# Patient Record
Sex: MALE | Race: WHITE | NOT HISPANIC OR LATINO | Employment: FULL TIME | URBAN - NONMETROPOLITAN AREA
[De-identification: names, ages, dates, MRNs, and addresses within clinical notes are randomized per-mention and may not be internally consistent; named-entity substitution may affect disease eponyms.]

---

## 2017-11-17 ENCOUNTER — HOSPITAL ENCOUNTER (OUTPATIENT)
Dept: GENERAL RADIOLOGY | Facility: HOSPITAL | Age: 57
Discharge: HOME | End: 2017-11-17
Admitting: PHYSICIAN ASSISTANT

## 2017-11-17 ENCOUNTER — TRANSCRIBE ORDERS (OUTPATIENT)
Dept: ADMINISTRATIVE | Facility: HOSPITAL | Age: 57
End: 2017-11-17

## 2017-11-17 DIAGNOSIS — G89.29 CHRONIC LEFT SHOULDER PAIN: Primary | ICD-10-CM

## 2017-11-17 DIAGNOSIS — M25.512 CHRONIC LEFT SHOULDER PAIN: Primary | ICD-10-CM

## 2017-11-17 DIAGNOSIS — T14.90XA INJURY: ICD-10-CM

## 2017-11-17 PROCEDURE — 73030 X-RAY EXAM OF SHOULDER: CPT

## 2017-12-05 ENCOUNTER — OFFICE VISIT (OUTPATIENT)
Dept: ORTHOPEDIC SURGERY | Facility: CLINIC | Age: 57
End: 2017-12-05

## 2017-12-05 VITALS — RESPIRATION RATE: 18 BRPM | HEIGHT: 72 IN | WEIGHT: 253 LBS | BODY MASS INDEX: 34.27 KG/M2

## 2017-12-05 DIAGNOSIS — M75.82 ROTATOR CUFF TENDINITIS, LEFT: ICD-10-CM

## 2017-12-05 DIAGNOSIS — M75.42 SHOULDER IMPINGEMENT SYNDROME, LEFT: ICD-10-CM

## 2017-12-05 DIAGNOSIS — S49.92XA SHOULDER INJURY, LEFT, INITIAL ENCOUNTER: Primary | ICD-10-CM

## 2017-12-05 PROCEDURE — 99203 OFFICE O/P NEW LOW 30 MIN: CPT | Performed by: PHYSICIAN ASSISTANT

## 2017-12-05 RX ORDER — TAMSULOSIN HYDROCHLORIDE 0.4 MG/1
CAPSULE ORAL
Refills: 1 | COMMUNITY
Start: 2017-11-12

## 2017-12-05 RX ORDER — METHOCARBAMOL 750 MG/1
TABLET, FILM COATED ORAL
Refills: 1 | COMMUNITY
Start: 2017-11-12 | End: 2018-02-21 | Stop reason: ALTCHOICE

## 2017-12-05 NOTE — PROGRESS NOTES
Subjective   Patient ID: Puma Lino is a 57 y.o. right hand dominant male is here today for a treatment planning discussion. Pain and Consult of the Left Shoulder (Patient is being seen today at the request of Dr. Alvarez./)         History of Present Illness  Patient presents as a new patient with complaints of left shoulder pain.  He states approximately 3 months ago while bench pressing he felt intense pain to the left shoulder and had immediately stop lifting weights.  He states since certain movements intensifies pain.  He denies currently neck pain or numbness and tingling.  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  He had noticed some soreness to shoulder before Bench pressing.         Pain Score: 3  Pain Location: Shoulder  Pain Orientation: Left     Pain Descriptors: Aching  Pain Frequency: Constant/continuous  Pain Onset: Ongoing  Date Pain First Started:  (about 4 months)     Aggravating Factors:  (use of shoulder, lifting and reaching over head)        Pain Intervention(s): Rest, Heat applied, Massage, Home medication (ibuprofen)  Result of Injury: No  Work-Related Injury: No    Past Medical History:   Diagnosis Date   • Asthma         Past Surgical History:   Procedure Laterality Date   • APPENDECTOMY      age 6       Family History   Problem Relation Age of Onset   • Diabetes Other        Social History     Social History   • Marital status: Single     Spouse name: N/A   • Number of children: N/A   • Years of education: N/A     Occupational History   •  [employer]     Social History Main Topics   • Smoking status: Never Smoker   • Smokeless tobacco: Current User     Types: Snuff   • Alcohol use Yes      Comment: socially   • Drug use: No   • Sexual activity: Defer     Other Topics Concern   • Not on file     Social History Narrative   • No narrative on file       No Known Allergies    Review  "of Systems   Constitutional: Negative for fever.   HENT: Negative for voice change.    Eyes: Negative for visual disturbance.   Respiratory: Negative for shortness of breath.    Cardiovascular: Negative for chest pain.   Gastrointestinal: Negative for abdominal distention and abdominal pain.   Genitourinary: Negative for dysuria.   Musculoskeletal: Positive for arthralgias. Negative for gait problem and joint swelling.   Skin: Negative for rash.   Neurological: Negative for speech difficulty.   Hematological: Does not bruise/bleed easily.   Psychiatric/Behavioral: Negative for confusion.       Objective   Resp 18  Ht 184 cm (72.44\")  Wt 115 kg (253 lb)  BMI 33.9 kg/m2   Physical Exam   Constitutional: He is oriented to person, place, and time. He appears well-nourished.   Neck: No tracheal deviation present.   Pulmonary/Chest: Effort normal.   Musculoskeletal:        Left shoulder: He exhibits tenderness and pain. He exhibits no swelling, no effusion, no crepitus and no deformity.        Left hand: He exhibits normal capillary refill, no deformity and no swelling. Normal sensation noted. Normal strength noted. He exhibits no thumb/finger opposition.   Neurological: He is alert and oriented to person, place, and time.   Skin: No pallor.   Psychiatric: He has a normal mood and affect.   Vitals reviewed.    Left Shoulder Exam     Range of Motion   Active Abduction: 120   Forward Flexion: 130     Muscle Strength   The patient has normal left shoulder strength.    Tests   Apprehension: negative  Cross Arm: positive  Drop Arm: negative  Impingement: positive    Other   Erythema: absent  Sensation: normal  Pulse: present              Neurologic Exam     Mental Status   Oriented to person, place, and time.      Left Shoulder Exam     Muscle Strength   Normal left shoulder strength               Assessment/Plan   Independent Review of Radiographic Studies:    Shows no acute fracture or dislocation.      Procedures     "   Puma was seen today for pain and consult.    Diagnoses and all orders for this visit:    Shoulder injury, left, initial encounter  -     MRI shoulder left wo contrast    Rotator cuff tendinitis, left  -     MRI shoulder left wo contrast    Shoulder impingement syndrome, left  -     MRI shoulder left wo contrast       Orthopedic activities reviewed and patient expressed appreciation  Discussion of orthopedic goals  Risk, benefits, and merits of treatment alternatives reviewed with the patient and questions answered  Avoid offending activity  Ice, heat, and/or modalities as beneficial    Recommendations/Plan:  Exercise, medications, injections, other patient advice, and return appointment as noted.  Patient is encouraged to call or return for any issues or concerns.  FU after MRI   ]  Patient agreeable to call or return sooner for any concerns.

## 2017-12-08 ENCOUNTER — HOSPITAL ENCOUNTER (OUTPATIENT)
Dept: MRI IMAGING | Facility: HOSPITAL | Age: 57
Discharge: HOME | End: 2017-12-08
Admitting: PHYSICIAN ASSISTANT

## 2017-12-08 PROCEDURE — 73221 MRI JOINT UPR EXTREM W/O DYE: CPT

## 2017-12-19 ENCOUNTER — OFFICE VISIT (OUTPATIENT)
Dept: ORTHOPEDIC SURGERY | Facility: CLINIC | Age: 57
End: 2017-12-19

## 2017-12-19 VITALS — HEIGHT: 72 IN | BODY MASS INDEX: 34.67 KG/M2 | WEIGHT: 256 LBS | RESPIRATION RATE: 18 BRPM

## 2017-12-19 DIAGNOSIS — M19.019 AC JOINT ARTHROPATHY: ICD-10-CM

## 2017-12-19 DIAGNOSIS — M25.812 OS ACROMIALE OF LEFT SHOULDER: ICD-10-CM

## 2017-12-19 DIAGNOSIS — M75.82 ROTATOR CUFF TENDINITIS, LEFT: ICD-10-CM

## 2017-12-19 DIAGNOSIS — M75.42 SHOULDER IMPINGEMENT SYNDROME, LEFT: ICD-10-CM

## 2017-12-19 DIAGNOSIS — S43.432A LABRAL TEAR OF SHOULDER, LEFT, INITIAL ENCOUNTER: Primary | ICD-10-CM

## 2017-12-19 DIAGNOSIS — M75.52 BURSITIS OF SHOULDER, LEFT: ICD-10-CM

## 2017-12-19 PROCEDURE — 20610 DRAIN/INJ JOINT/BURSA W/O US: CPT | Performed by: PHYSICIAN ASSISTANT

## 2017-12-19 PROCEDURE — 99213 OFFICE O/P EST LOW 20 MIN: CPT | Performed by: PHYSICIAN ASSISTANT

## 2017-12-19 RX ORDER — BETAMETHASONE SODIUM PHOSPHATE AND BETAMETHASONE ACETATE 3; 3 MG/ML; MG/ML
6 INJECTION, SUSPENSION INTRA-ARTICULAR; INTRALESIONAL; INTRAMUSCULAR; SOFT TISSUE
Status: COMPLETED | OUTPATIENT
Start: 2017-12-19 | End: 2017-12-19

## 2017-12-19 RX ORDER — LIDOCAINE HYDROCHLORIDE 10 MG/ML
2 INJECTION, SOLUTION INFILTRATION; PERINEURAL
Status: COMPLETED | OUTPATIENT
Start: 2017-12-19 | End: 2017-12-19

## 2017-12-19 RX ADMIN — BETAMETHASONE SODIUM PHOSPHATE AND BETAMETHASONE ACETATE 6 MG: 3; 3 INJECTION, SUSPENSION INTRA-ARTICULAR; INTRALESIONAL; INTRAMUSCULAR; SOFT TISSUE at 09:09

## 2017-12-19 RX ADMIN — LIDOCAINE HYDROCHLORIDE 2 ML: 10 INJECTION, SOLUTION INFILTRATION; PERINEURAL at 09:09

## 2017-12-19 NOTE — PROGRESS NOTES
Subjective   Patient ID: Puma Lino is a 57 y.o. right hand dominant male is here today for follow-up for left shoulder pain Follow-up and Pain of the Left Shoulder         History of Present Illness    Patient is following up in regards to left shoulder pain as well as to review MRI results.  Patient initially noticed left shoulder pain 3 months prior all bench pressing when he felt intense pain to the left shoulder.  He denies having neck pain.  Denies numbness or tingling.  He has tried anti-inflammatories with no relief in his pain.    Pain Score: 3  Pain Location: Shoulder  Pain Orientation: Left        Pain Frequency: Constant/continuous           Aggravating Factors:  (using arm)        Pain Intervention(s): Rest  Result of Injury: No  Work-Related Injury: No    Past Medical History:   Diagnosis Date   • Asthma         Past Surgical History:   Procedure Laterality Date   • APPENDECTOMY      age 6       Family History   Problem Relation Age of Onset   • Diabetes Other        Social History     Social History   • Marital status: Single     Spouse name: N/A   • Number of children: N/A   • Years of education: N/A     Occupational History   •  [employer]     Social History Main Topics   • Smoking status: Never Smoker   • Smokeless tobacco: Current User     Types: Snuff   • Alcohol use Yes      Comment: socially   • Drug use: No   • Sexual activity: Defer     Other Topics Concern   • Not on file     Social History Narrative       No Known Allergies    Review of Systems   Constitutional: Negative for fever.   HENT: Negative for voice change.    Eyes: Negative for visual disturbance.   Respiratory: Negative for shortness of breath.    Cardiovascular: Negative for chest pain.   Gastrointestinal: Negative for abdominal distention and abdominal pain.   Genitourinary: Negative for dysuria.   Musculoskeletal: Positive for arthralgias. Negative for gait problem and joint swelling.   Skin: Negative  "for rash.   Neurological: Negative for speech difficulty.   Hematological: Does not bruise/bleed easily.   Psychiatric/Behavioral: Negative for confusion.       Objective   Resp 18  Ht 184 cm (72.44\")  Wt 116 kg (256 lb)  BMI 34.3 kg/m2   Physical Exam   Constitutional: He appears well-nourished.   Neck: No tracheal deviation present.   Musculoskeletal:        Left shoulder: He exhibits tenderness and pain. He exhibits normal range of motion and no deformity.        Left hand: He exhibits normal capillary refill and no deformity. Normal sensation noted. Normal strength noted.   Neurological: He is alert.   Psychiatric: He has a normal mood and affect.   Vitals reviewed.    Left Shoulder Exam     Range of Motion   Left shoulder active abduction: 122.   Left shoulder forward flexion: 125.   Internal Rotation 0 degrees: Sacrum     Muscle Strength   The patient has normal left shoulder strength.    Tests   Apprehension: positive  Cross Arm: positive  Drop Arm: negative  Impingement: positive    Other   Erythema: absent  Sensation: normal  Pulse: present              Neurologic Exam   Left Shoulder Exam     Muscle Strength   Normal left shoulder strength               Assessment/Plan   Independent Review of Radiographic Studies:    No new imaging done today.    I did review an MRI of the left shoulder with the patient and the office.  Per radiology there is a probable posterior labral tear, a partial tear of the supraspinatus, and pes planus and subscapularis without full-thickness defect.  There is subacromial bursitis    Large Joint Arthrocentesis  Date/Time: 12/19/2017 9:09 AM  Consent given by: patient  Site marked: site marked  Timeout: Immediately prior to procedure a time out was called to verify the correct patient, procedure, equipment, support staff and site/side marked as required   Supporting Documentation  Indications: pain   Procedure Details  Location: shoulder - L subacromial bursa  Preparation: " Patient was prepped and draped in the usual sterile fashion  Needle size: 22 G  Approach: posterior  Medications administered: 2 mL lidocaine 1 %; 6 mg betamethasone acetate-betamethasone sodium phosphate 6 (3-3) MG/ML  Patient tolerance: patient tolerated the procedure well with no immediate complications             Puma was seen today for follow-up and pain.    Diagnoses and all orders for this visit:    Labral tear of shoulder, left, initial encounter  -     Ambulatory Referral to Physical Therapy Evaluate and treat, Ortho  -     Large Joint Arthrocentesis    Rotator cuff tendinitis, left  -     Ambulatory Referral to Physical Therapy Evaluate and treat, Ortho  -     Large Joint Arthrocentesis    Bursitis of shoulder, left  -     Ambulatory Referral to Physical Therapy Evaluate and treat, Ortho  -     Large Joint Arthrocentesis    AC joint arthropathy  -     Ambulatory Referral to Physical Therapy Evaluate and treat, Ortho  -     Large Joint Arthrocentesis    Os acromiale of left shoulder  -     Ambulatory Referral to Physical Therapy Evaluate and treat, Ortho  -     Large Joint Arthrocentesis    Shoulder impingement syndrome, left  -     Ambulatory Referral to Physical Therapy Evaluate and treat, Ortho  -     Large Joint Arthrocentesis       Orthopedic activities reviewed and patient expressed appreciation  Discussion of orthopedic goals  Risk, benefits, and merits of treatment alternatives reviewed with the patient and questions answered  Physical therapy referral given  Call or notify for any adverse effect from injection therapy    Recommendations/Plan:  Exercise, medications, injections, other patient advice, and return appointment as noted.  Patient is encouraged to call or return for any issues or concerns.  I discussed with the patient several treatment options which include nonsurgical management, cortisone injection therapy referral.  If no relief in his symptoms within 6-8 weeks we could discuss  shoulder arthroscopy.  Patient is content with the plan of care.  Return in about 3 months (around 3/19/2018).  Patient agreeable to call or return sooner for any concerns.

## 2018-02-21 ENCOUNTER — OFFICE VISIT (OUTPATIENT)
Dept: ORTHOPEDIC SURGERY | Facility: CLINIC | Age: 58
End: 2018-02-21

## 2018-02-21 VITALS — HEIGHT: 72 IN | BODY MASS INDEX: 34.67 KG/M2 | WEIGHT: 256 LBS | RESPIRATION RATE: 18 BRPM

## 2018-02-21 DIAGNOSIS — M75.82 ROTATOR CUFF TENDINITIS, LEFT: ICD-10-CM

## 2018-02-21 DIAGNOSIS — S43.432A LABRAL TEAR OF SHOULDER, LEFT, INITIAL ENCOUNTER: Primary | ICD-10-CM

## 2018-02-21 DIAGNOSIS — M19.019 AC JOINT ARTHROPATHY: ICD-10-CM

## 2018-02-21 DIAGNOSIS — M75.52 BURSITIS OF SHOULDER, LEFT: ICD-10-CM

## 2018-02-21 PROCEDURE — 99213 OFFICE O/P EST LOW 20 MIN: CPT | Performed by: PHYSICIAN ASSISTANT

## 2018-02-21 RX ORDER — FINASTERIDE 5 MG/1
TABLET, FILM COATED ORAL
Refills: 0 | COMMUNITY
Start: 2018-02-13 | End: 2018-04-16

## 2018-02-21 RX ORDER — ORPHENADRINE CITRATE 100 MG/1
100 TABLET, EXTENDED RELEASE ORAL DAILY
Qty: 14 TABLET | Refills: 0 | Status: SHIPPED | OUTPATIENT
Start: 2018-02-21 | End: 2018-04-16

## 2018-02-21 RX ORDER — METHYLPREDNISOLONE 4 MG/1
TABLET ORAL
Qty: 21 TABLET | Refills: 0 | Status: SHIPPED | OUTPATIENT
Start: 2018-02-21 | End: 2018-04-16

## 2018-02-21 RX ORDER — CEFDINIR 300 MG/1
CAPSULE ORAL
Refills: 0 | COMMUNITY
Start: 2018-02-13 | End: 2018-04-16

## 2018-02-21 NOTE — PROGRESS NOTES
Subjective   Patient ID: Puma Lino is a 57 y.o. right hand dominant male is here today for follow-up for left shoulder pain Follow-up and Pain of the Left Shoulder         History of Present Illness    Patient is following up in regards to left shoulder discomfort.  He has attended 12 visits of physical therapy at [facility] and taken over-the-counter anti-inflammatories.  He states he is feeling about 30-40% better.  He denies neck pain.  Denies numbness or tingling to the left upper extremity.    Patient initially noticed his left shoulder pain Oct. 2017 while bench pressing.  Patient had MRI of left shoulder: 12-8-17 Probable left labral tear, tendinitis and small partial tears of supraspinatus, infraspinatus and subscapularis.  There is subacromial bursitis and degenerative changes of the acromioclavicular joint    Pain Score: 1  Pain Location: Shoulder  Pain Orientation: Left     Pain Descriptors: Aching, Sore  Pain Frequency: Intermittent           Aggravating Factors:  (use of arm)        Pain Intervention(s): Rest          Past Medical History:   Diagnosis Date   • Asthma         Past Surgical History:   Procedure Laterality Date   • APPENDECTOMY      age 6       Family History   Problem Relation Age of Onset   • Diabetes Other        Social History     Social History   • Marital status: Single     Spouse name: N/A   • Number of children: N/A   • Years of education: N/A     Occupational History   •  [employer]     Social History Main Topics   • Smoking status: Never Smoker   • Smokeless tobacco: Current User     Types: Snuff   • Alcohol use Yes      Comment: socially   • Drug use: No   • Sexual activity: Defer     Other Topics Concern   • Not on file     Social History Narrative       No Known Allergies    Review of Systems   Constitutional: Negative for fever.   HENT: Negative for voice change.    Eyes: Negative for visual disturbance.   Respiratory: Negative for shortness  "of breath.    Cardiovascular: Negative for chest pain.   Gastrointestinal: Negative for abdominal distention and abdominal pain.   Genitourinary: Negative for dysuria.   Musculoskeletal: Positive for arthralgias. Negative for gait problem and joint swelling.   Skin: Negative for rash.   Neurological: Negative for speech difficulty.   Hematological: Does not bruise/bleed easily.   Psychiatric/Behavioral: Negative for confusion.       Objective   Resp 18  Ht 184 cm (72.44\")  Wt 116 kg (256 lb)  BMI 34.3 kg/m2   Physical Exam   Constitutional: He is oriented to person, place, and time.   Eyes: Conjunctivae are normal.   Neck: No tracheal deviation present.   Pulmonary/Chest: Effort normal. No respiratory distress.   Musculoskeletal:        Left shoulder: He exhibits tenderness (posterior Sub acromial space) and pain. He exhibits normal range of motion, no swelling, no effusion, no crepitus and no deformity.        Left wrist: He exhibits normal range of motion, no tenderness, no bony tenderness and no swelling.        Cervical back: He exhibits normal range of motion, no tenderness, no bony tenderness and no swelling.        Left hand: He exhibits normal range of motion, no tenderness, normal capillary refill, no deformity and no swelling. Normal sensation noted. Normal strength noted. He exhibits no thumb/finger opposition.   Neurological: He is alert and oriented to person, place, and time.   Skin: No rash noted.   Psychiatric: He has a normal mood and affect. His behavior is normal.   Vitals reviewed.    Left Shoulder Exam     Tenderness   The patient is experiencing tenderness in the acromion and acromioclavicular joint.    Range of Motion   Active Abduction: 130   Forward Flexion: 130   Internal Rotation 0 degrees: Lumbar     Tests   Cross Arm: positive  Drop Arm: negative  Impingement: negative    Other   Erythema: absent  Sensation: normal  Pulse: present              Neurologic Exam     Mental Status "   Oriented to person, place, and time.      Ortho Exam         Assessment/Plan   Independent Review of Radiographic Studies:    No new imaging done today.       Procedures       Puma was seen today for follow-up and pain.    Diagnoses and all orders for this visit:    Labral tear of shoulder, left, initial encounter    Rotator cuff tendinitis, left    Bursitis of shoulder, left    AC joint arthropathy    Other orders  -     orphenadrine (NORFLEX) 100 MG 12 hr tablet; Take 1 tablet by mouth Daily.  -     MethylPREDNISolone (MEDROL, JOVANA,) 4 MG tablet; Use as directed by package instructions     Discussion of orthopedic goals  Risk, benefits, and merits of treatment alternatives reviewed with the patient and questions answered  Reduced physical activity as appropriate  Weight bearing parameters reviewed  Avoid offending activity  Ice, heat, and/or modalities as beneficial    Recommendations/Plan:  Exercise, medications, injections, other patient advice, and return appointment as noted.  Patient is encouraged to call or return for any issues or concerns.  We briefly discussed the option of left shoulder arthroscopy.  However, he states he would like to give therapy a little longer as he is noticing some improvement.  Fu after 4 weeks of PT    Patient agreeable to call or return sooner for any concerns.

## 2018-04-16 ENCOUNTER — OFFICE VISIT (OUTPATIENT)
Dept: ORTHOPEDIC SURGERY | Facility: CLINIC | Age: 58
End: 2018-04-16

## 2018-04-16 VITALS — BODY MASS INDEX: 35.08 KG/M2 | HEIGHT: 72 IN | RESPIRATION RATE: 18 BRPM | WEIGHT: 259 LBS

## 2018-04-16 DIAGNOSIS — M75.52 BURSITIS OF SHOULDER, LEFT: ICD-10-CM

## 2018-04-16 DIAGNOSIS — S43.432A LABRAL TEAR OF SHOULDER, LEFT, INITIAL ENCOUNTER: Primary | ICD-10-CM

## 2018-04-16 DIAGNOSIS — M19.019 AC JOINT ARTHROPATHY: ICD-10-CM

## 2018-04-16 DIAGNOSIS — M75.82 ROTATOR CUFF TENDINITIS, LEFT: ICD-10-CM

## 2018-04-16 PROCEDURE — 99213 OFFICE O/P EST LOW 20 MIN: CPT | Performed by: PHYSICIAN ASSISTANT

## 2018-04-16 RX ORDER — IBUPROFEN 800 MG/1
TABLET ORAL
Refills: 0 | COMMUNITY
Start: 2018-04-03

## 2018-04-16 NOTE — PROGRESS NOTES
Subjective   Patient ID: Puma Lino is a 58 y.o. right hand dominant male  Follow-up and Pain of the Left Shoulder         History of Present Illness  Patient is following up for routine follow-up visit.  Patient initially noticed his left shoulder pain Oct. 2017 while bench pressing.  Patient had MRI of left shoulder: 12-8-17 Probable left labral tear, tendinitis and small partial tears of supraspinatus, infraspinatus and subscapularis.  There is subacromial bursitis and degenerative changes of the acromioclavicular joint    Patient states he attended 3 months of physical therapy and is pleased report he is feeling much better.  He states he has a minor occasional pain with certain movements of the left arm but overall is very well pleased with his results.    Pain Score: no pain  Pain Location: Shoulder  Pain Orientation: Left      Past Medical History:   Diagnosis Date   • Asthma         Past Surgical History:   Procedure Laterality Date   • APPENDECTOMY      age 6       Family History   Problem Relation Age of Onset   • Diabetes Other        Social History     Social History   • Marital status: Single     Spouse name: N/A   • Number of children: N/A   • Years of education: N/A     Occupational History   •  [employer]     Social History Main Topics   • Smoking status: Never Smoker   • Smokeless tobacco: Current User     Types: Snuff   • Alcohol use Yes      Comment: socially   • Drug use: No   • Sexual activity: Defer     Other Topics Concern   • Not on file     Social History Narrative   • No narrative on file       No Known Allergies    Review of Systems   Constitutional: Negative for fever.   HENT: Negative for voice change.    Eyes: Negative for visual disturbance.   Respiratory: Negative for shortness of breath.    Cardiovascular: Negative for chest pain.   Gastrointestinal: Negative for abdominal distention and abdominal pain.   Genitourinary: Negative for dysuria.   Musculoskeletal:  "Positive for arthralgias. Negative for gait problem and joint swelling.   Skin: Negative for rash.   Neurological: Negative for speech difficulty.   Hematological: Does not bruise/bleed easily.   Psychiatric/Behavioral: Negative for confusion.   All other systems reviewed and are negative.      Objective   Resp 18   Ht 184 cm (72.44\")   Wt 117 kg (259 lb)   BMI 34.70 kg/m²    Physical Exam   Constitutional: He is oriented to person, place, and time. He appears well-nourished.   Neck: No tracheal deviation present.   Pulmonary/Chest: No respiratory distress.   Musculoskeletal:        Left shoulder: He exhibits normal range of motion, no tenderness, no bony tenderness, no swelling, no effusion, no crepitus, no deformity and no pain.        Left hand: He exhibits normal range of motion, no tenderness, normal capillary refill and no swelling. Normal sensation noted. He exhibits no thumb/finger opposition.   Neurological: He is alert and oriented to person, place, and time.   Skin: Capillary refill takes less than 2 seconds.   Psychiatric: He has a normal mood and affect. His behavior is normal.   Vitals reviewed.    Left Shoulder Exam     Range of Motion   Active Abduction: 140   Forward Flexion: 140   Internal Rotation 0 degrees: Sacrum     Muscle Strength   Abduction: 4/5   Internal Rotation: 5/5   External Rotation: 5/5   Supraspinatus: 5/5   Subscapularis: 5/5   Biceps: 5/5     Tests   Apprehension: negative  Cross Arm: positive  Drop Arm: negative  Hawkin's test: negative  Impingement: negative  Sulcus: absent    Other   Erythema: absent  Sensation: normal               Neurologic Exam     Mental Status   Oriented to person, place, and time.      Ortho Exam     Neg. spurling's      Assessment/Plan   Independent Review of Radiographic Studies:    No new imaging done today.  Reviewed with patient at a prior visit.      Procedures       Puma was seen today for follow-up and pain.    Diagnoses and all orders for " this visit:    Labral tear of shoulder, left, initial encounter    Rotator cuff tendinitis, left    Bursitis of shoulder, left    AC joint arthropathy       Orthopedic activities reviewed and patient expressed appreciation  Discussion of orthopedic goals  Risk, benefits, and merits of treatment alternatives reviewed with the patient and questions answered    Recommendations/Plan:  Patient is encouraged to call or return for any issues or concerns.    Patient states he is content with not having a shoulder arthroscopy at this time.  He will call back if the shoulder pain returns.  Patient agreeable to call or return sooner for any concerns.

## 2019-12-04 ENCOUNTER — HOSPITAL ENCOUNTER (OUTPATIENT)
Dept: GENERAL RADIOLOGY | Facility: HOSPITAL | Age: 59
Discharge: HOME | End: 2019-12-04
Admitting: PHYSICIAN ASSISTANT

## 2019-12-04 ENCOUNTER — TRANSCRIBE ORDERS (OUTPATIENT)
Dept: GENERAL RADIOLOGY | Facility: HOSPITAL | Age: 59
End: 2019-12-04

## 2019-12-04 DIAGNOSIS — M89.8X8 MASS OF STERNUM: ICD-10-CM

## 2019-12-04 DIAGNOSIS — M89.8X8 MASS OF STERNUM: Primary | ICD-10-CM

## 2019-12-04 PROCEDURE — 71120 X-RAY EXAM BREASTBONE 2/>VWS: CPT

## 2019-12-04 PROCEDURE — 71046 X-RAY EXAM CHEST 2 VIEWS: CPT

## 2021-08-30 ENCOUNTER — HOSPITAL ENCOUNTER (OUTPATIENT)
Dept: GENERAL RADIOLOGY | Facility: HOSPITAL | Age: 61
Discharge: HOME | End: 2021-08-30
Payer: COMMERCIAL

## 2021-08-30 ENCOUNTER — HOSPITAL ENCOUNTER (OUTPATIENT)
Facility: HOSPITAL | Age: 61
Discharge: HOME | End: 2021-08-30
Payer: COMMERCIAL

## 2021-08-30 ENCOUNTER — HOSPITAL ENCOUNTER (OUTPATIENT)
Dept: NURSING | Facility: HOSPITAL | Age: 61
Setting detail: INFUSION SERIES
Discharge: HOME | End: 2021-09-01
Payer: COMMERCIAL

## 2021-08-30 VITALS
OXYGEN SATURATION: 95 % | SYSTOLIC BLOOD PRESSURE: 154 MMHG | TEMPERATURE: 96.9 F | HEART RATE: 81 BPM | RESPIRATION RATE: 20 BRPM | DIASTOLIC BLOOD PRESSURE: 84 MMHG

## 2021-08-30 DIAGNOSIS — U07.1 COVID-19: Primary | ICD-10-CM

## 2021-08-30 DIAGNOSIS — R05.9 COUGH: ICD-10-CM

## 2021-08-30 DIAGNOSIS — U07.1 COVID-19: ICD-10-CM

## 2021-08-30 DIAGNOSIS — R50.9 FEVER, UNSPECIFIED FEVER CAUSE: ICD-10-CM

## 2021-08-30 PROCEDURE — 96365 THER/PROPH/DIAG IV INF INIT: CPT

## 2021-08-30 PROCEDURE — 2580000003 HC RX 258: Performed by: PHYSICIAN ASSISTANT

## 2021-08-30 PROCEDURE — 71046 X-RAY EXAM CHEST 2 VIEWS: CPT

## 2021-08-30 RX ORDER — METHYLPREDNISOLONE SODIUM SUCCINATE 125 MG/2ML
125 INJECTION, POWDER, LYOPHILIZED, FOR SOLUTION INTRAMUSCULAR; INTRAVENOUS ONCE
OUTPATIENT
Start: 2021-08-30 | End: 2021-08-30

## 2021-08-30 RX ORDER — SODIUM CHLORIDE 9 MG/ML
INJECTION, SOLUTION INTRAVENOUS CONTINUOUS
OUTPATIENT
Start: 2021-08-30

## 2021-08-30 RX ORDER — SODIUM CHLORIDE 9 MG/ML
25 INJECTION, SOLUTION INTRAVENOUS PRN
Status: ACTIVE | OUTPATIENT
Start: 2021-08-30 | End: 2021-08-31

## 2021-08-30 RX ORDER — EPINEPHRINE 1 MG/ML
0.3 INJECTION, SOLUTION, CONCENTRATE INTRAVENOUS PRN
OUTPATIENT
Start: 2021-08-30

## 2021-08-30 RX ORDER — SODIUM CHLORIDE 9 MG/ML
25 INJECTION, SOLUTION INTRAVENOUS PRN
OUTPATIENT
Start: 2021-08-30

## 2021-08-30 RX ORDER — SODIUM CHLORIDE 9 MG/ML
25 INJECTION, SOLUTION INTRAVENOUS PRN
Status: CANCELLED | OUTPATIENT
Start: 2021-08-30

## 2021-08-30 RX ORDER — DIPHENHYDRAMINE HYDROCHLORIDE 50 MG/ML
50 INJECTION INTRAMUSCULAR; INTRAVENOUS ONCE
OUTPATIENT
Start: 2021-08-30 | End: 2021-08-30

## 2021-08-30 RX ADMIN — SODIUM CHLORIDE 25 ML: 9 INJECTION, SOLUTION INTRAVENOUS at 11:19

## 2021-08-30 RX ADMIN — SODIUM CHLORIDE: 9 INJECTION, SOLUTION INTRAVENOUS at 11:19

## 2021-08-30 NOTE — FLOWSHEET NOTE
Pt iv d/c\"d. Radiology called and will caome to take pt for chest x ray. No complications with regen-cov infusion.

## 2021-08-30 NOTE — FLOWSHEET NOTE
Pt arrived to clinic for out pt Regen-cov infusion. RN utilizing proper aerosol droplet Covid precautions. Educated pt on infusion therapy. VSS. IV inserted, #22 in right arm. No complications at site and appears to be in good working condition. Pharmacy notified. Medications delivered. Infusions started. See MAR. Will continue to monitor pt throughout infusion.

## 2021-08-30 NOTE — FLOWSHEET NOTE
Regen-cov infusion complete. Observed pt for 1hr post infusion reaction. Pt tolerated without reaction. IV dc'ed. No complications at site.

## 2024-12-12 ENCOUNTER — TRANSCRIBE ORDERS (OUTPATIENT)
Dept: ULTRASOUND IMAGING | Facility: HOSPITAL | Age: 64
End: 2024-12-12
Payer: COMMERCIAL

## 2024-12-12 ENCOUNTER — HOSPITAL ENCOUNTER (OUTPATIENT)
Dept: ULTRASOUND IMAGING | Facility: HOSPITAL | Age: 64
Discharge: HOME | End: 2024-12-12
Admitting: PHYSICIAN ASSISTANT
Payer: COMMERCIAL

## 2024-12-12 DIAGNOSIS — M79.605 LEFT LEG PAIN: Primary | ICD-10-CM

## 2024-12-12 DIAGNOSIS — M79.605 LEFT LEG PAIN: ICD-10-CM

## 2024-12-12 PROCEDURE — 93971 EXTREMITY STUDY: CPT
